# Patient Record
Sex: MALE | Race: WHITE | Employment: OTHER | URBAN - METROPOLITAN AREA
[De-identification: names, ages, dates, MRNs, and addresses within clinical notes are randomized per-mention and may not be internally consistent; named-entity substitution may affect disease eponyms.]

---

## 2019-04-06 ENCOUNTER — HOSPITAL ENCOUNTER (EMERGENCY)
Age: 34
Discharge: HOME | End: 2019-04-06
Attending: EMERGENCY MEDICINE
Payer: OTHER GOVERNMENT

## 2019-04-06 VITALS
WEIGHT: 182 LBS | OXYGEN SATURATION: 100 % | SYSTOLIC BLOOD PRESSURE: 149 MMHG | HEIGHT: 66 IN | RESPIRATION RATE: 16 BRPM | HEART RATE: 78 BPM | DIASTOLIC BLOOD PRESSURE: 86 MMHG | BODY MASS INDEX: 29.25 KG/M2

## 2019-04-06 DIAGNOSIS — M54.50 ACUTE LEFT-SIDED LOW BACK PAIN WITHOUT SCIATICA: ICD-10-CM

## 2019-04-06 DIAGNOSIS — T14.8XXA MUSCLE STRAIN: Primary | ICD-10-CM

## 2019-04-06 PROCEDURE — 74011250636 HC RX REV CODE- 250/636: Performed by: NURSE PRACTITIONER

## 2019-04-06 PROCEDURE — 99283 EMERGENCY DEPT VISIT LOW MDM: CPT

## 2019-04-06 PROCEDURE — 74011250637 HC RX REV CODE- 250/637: Performed by: NURSE PRACTITIONER

## 2019-04-06 PROCEDURE — 96372 THER/PROPH/DIAG INJ SC/IM: CPT

## 2019-04-06 RX ORDER — METHOCARBAMOL 750 MG/1
750 TABLET, FILM COATED ORAL 4 TIMES DAILY
Qty: 10 TAB | Refills: 0 | Status: SHIPPED | OUTPATIENT
Start: 2019-04-06

## 2019-04-06 RX ORDER — IBUPROFEN 600 MG/1
600 TABLET ORAL
Qty: 20 TAB | Refills: 0 | Status: SHIPPED | OUTPATIENT
Start: 2019-04-06

## 2019-04-06 RX ORDER — KETOROLAC TROMETHAMINE 30 MG/ML
30 INJECTION, SOLUTION INTRAMUSCULAR; INTRAVENOUS
Status: COMPLETED | OUTPATIENT
Start: 2019-04-06 | End: 2019-04-06

## 2019-04-06 RX ORDER — METHOCARBAMOL 500 MG/1
750 TABLET, FILM COATED ORAL
Status: COMPLETED | OUTPATIENT
Start: 2019-04-06 | End: 2019-04-06

## 2019-04-06 RX ADMIN — KETOROLAC TROMETHAMINE 30 MG: 30 INJECTION, SOLUTION INTRAMUSCULAR; INTRAVENOUS at 18:51

## 2019-04-06 RX ADMIN — METHOCARBAMOL TABLETS 750 MG: 500 TABLET, COATED ORAL at 18:51

## 2019-04-06 NOTE — DISCHARGE INSTRUCTIONS
Follow up as directed   Take medications as prescribed   You may take Tylenol every 4-6 hours do not exceed 4000 mg in a days or Motrin every 6-8 hours, only take with food and do not take on an empty stomach of if you are on blood thinner, or have a history of abdominal ulcers   Return to the ED for increased pain, pain not improving in 2-3 days, loss of bowel or bladder, numbness or tingling, numbness in your abdominal areas, difficulty walking or worsening of symptoms     Patient Education        Back Pain: Care Instructions  Your Care Instructions    Back pain has many possible causes. It is often related to problems with muscles and ligaments of the back. It may also be related to problems with the nerves, discs, or bones of the back. Moving, lifting, standing, sitting, or sleeping in an awkward way can strain the back. Sometimes you don't notice the injury until later. Arthritis is another common cause of back pain. Although it may hurt a lot, back pain usually improves on its own within several weeks. Most people recover in 12 weeks or less. Using good home treatment and being careful not to stress your back can help you feel better sooner. Follow-up care is a key part of your treatment and safety. Be sure to make and go to all appointments, and call your doctor if you are having problems. It's also a good idea to know your test results and keep a list of the medicines you take. How can you care for yourself at home? · Sit or lie in positions that are most comfortable and reduce your pain. Try one of these positions when you lie down:  ? Lie on your back with your knees bent and supported by large pillows. ? Lie on the floor with your legs on the seat of a sofa or chair. ? Lie on your side with your knees and hips bent and a pillow between your legs. ? Lie on your stomach if it does not make pain worse. · Do not sit up in bed, and avoid soft couches and twisted positions.  Bed rest can help relieve pain at first, but it delays healing. Avoid bed rest after the first day of back pain. · Change positions every 30 minutes. If you must sit for long periods of time, take breaks from sitting. Get up and walk around, or lie in a comfortable position. · Try using a heating pad on a low or medium setting for 15 to 20 minutes every 2 or 3 hours. Try a warm shower in place of one session with the heating pad. · You can also try an ice pack for 10 to 15 minutes every 2 to 3 hours. Put a thin cloth between the ice pack and your skin. · Take pain medicines exactly as directed. ? If the doctor gave you a prescription medicine for pain, take it as prescribed. ? If you are not taking a prescription pain medicine, ask your doctor if you can take an over-the-counter medicine. · Take short walks several times a day. You can start with 5 to 10 minutes, 3 or 4 times a day, and work up to longer walks. Walk on level surfaces and avoid hills and stairs until your back is better. · Return to work and other activities as soon as you can. Continued rest without activity is usually not good for your back. · To prevent future back pain, do exercises to stretch and strengthen your back and stomach. Learn how to use good posture, safe lifting techniques, and proper body mechanics. When should you call for help? Call your doctor now or seek immediate medical care if:    · You have new or worsening numbness in your legs.     · You have new or worsening weakness in your legs. (This could make it hard to stand up.)     · You lose control of your bladder or bowels.    Watch closely for changes in your health, and be sure to contact your doctor if:    · You have a fever, lose weight, or don't feel well.     · You do not get better as expected. Where can you learn more? Go to http://maren-malka.info/. Enter K275 in the search box to learn more about \"Back Pain: Care Instructions. \"  Current as of: September 20, 2018  Content Version: 11.9  © 3391-0283 [employer]. Care instructions adapted under license by [employer] (which disclaims liability or warranty for this information). If you have questions about a medical condition or this instruction, always ask your healthcare professional. [address] any warranty or liability for your use of this information.

## 2019-04-06 NOTE — ED PROVIDER NOTES
EMERGENCY DEPARTMENT HISTORY AND PHYSICAL EXAM 
 
Date: 4/6/2019 Patient Name: Jayson Cervantes History of Presenting Illness Chief Complaint Patient presents with  Back Pain History Provided By: Patient Additional History (Context):  
6:18 PM 
Jayson Cervantes is a 35 y.o. male with PMHX pretension presents to the ED c/o low back pain. The patient reports he was working out and was doing dead lifts when he stood up and felt a pop in the left side of his back and experienced severe pain. He states the pain is currently a 5 out of 10 that is located in his left back and does not radiate. The pain does increase when he attempts to change position or ambulate. He denies previous back injury. He did not take any medications prior to arrival.  Pt denies loss of bowel or bladder, numbness or tingling, previous injury, saddle paresthesia and any other sxs or complaints. PCP: Tessy, MD Kallie 
 
 
 
Past History Past Medical History: 
Past Medical History:  
Diagnosis Date  Hypertension Past Surgical History: 
History reviewed. No pertinent surgical history. Family History: 
History reviewed. No pertinent family history. Social History: 
Social History Tobacco Use  Smoking status: Current Every Day Smoker Packs/day: 0.25  Smokeless tobacco: Never Used Substance Use Topics  Alcohol use: Yes  Drug use: Never Allergies: 
Not on File Review of Systems Review of Systems Respiratory: Negative for shortness of breath. Cardiovascular: Negative for chest pain. Gastrointestinal:  
     Denies saddle paresthesia Genitourinary: Negative for dysuria. Musculoskeletal: Positive for back pain. Negative for neck pain. Skin: Negative for color change. Neurological: Negative for numbness. Hematological: Does not bruise/bleed easily. All other systems reviewed and are negative. Physical Exam  
 
Vitals:  
 04/06/19 1738 BP: 149/86 Pulse: 78 Resp: 16 SpO2: 100% Weight: 82.6 kg (182 lb) Height: 5' 6\" (1.676 m) Physical Exam  
Constitutional: He is oriented to person, place, and time. He appears well-developed and well-nourished. Ill-appearing, NAD, fit male sitting in fast track chair HENT:  
Head: Normocephalic and atraumatic. Eyes: Conjunctivae are normal.  
Neck: Normal range of motion. Neck supple. Today or tenderness to palpation Cardiovascular: Normal rate and regular rhythm. Pulmonary/Chest: Effort normal and breath sounds normal.  
Abdominal: Soft. Bowel sounds are normal. There is no tenderness. There is no rebound and no guarding. Denies saddle paresthesia Musculoskeletal: Normal range of motion. Strong equal strength bilateral lower extremities, ambulates with normal gait, sensation intact, denies paresthesia, no midline lumbar or spinal tenderness to palpation or step-off noted, left paraspinal tenderness to palpation, negative straight leg raise bilaterally Neurological: He is alert and oriented to person, place, and time. Skin: Skin is warm and dry. Nursing note and vitals reviewed. Diagnostic Study Results Labs: 
 No results found for this or any previous visit (from the past 12 hour(s)). Radiologic Studies: No orders to display CT Results  (Last 48 hours) None CXR Results  (Last 48 hours) None Medical Decision Making I am the first provider for this patient. I reviewed the vital signs, available nursing notes, past medical history, past surgical history, family history and social history. Vital Signs: Reviewed the patient's vital signs. Pulse Oximetry Analysis: 100% on RA Records Reviewed: REVIEWED OLD RECORDS AND NURSING NOTES Procedures: 
Procedures ED Course: 
6:18 PM: Initial Contact Provider Notes (Medical Decision Making): Patient presents emergency department complaining of left lower back pain after working out prior to arrival.  He denies red flag symptoms. He has no midline tenderness to palpation. No need for further imaging at this time. Will treat with anti-inflammatories and muscle relaxers with strict return precautions. 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 Pt understands reasons to return and is offering no questions or concerns. Diagnosis and Disposition Discharge Note: 
6:49 PM 
Marvie Barthel Smiley's  results have been reviewed with him. He has been counseled regarding his diagnosis, treatment, and plan. He verbally conveys understanding and agreement of the signs, symptoms, diagnosis, treatment and prognosis and additionally agrees to follow up as discussed. He also agrees with the care-plan and conveys that all of his questions have been answered. I have also provided discharge instructions for him that include: educational information regarding their diagnosis and treatment, and list of reasons why they would want to return to the ED prior to their follow-up appointment, should his condition change. He has been provided with education for proper emergency department utilization. Clinical Impression: 1. Muscle strain 2. Acute left-sided low back pain without sciatica Plan: 
1. D/C Home 2. Discharge Medication List as of 4/6/2019  7:02 PM  
  
START taking these medications Details  
ibuprofen (MOTRIN) 600 mg tablet Take 1 Tab by mouth every six (6) hours as needed for Pain., Print, Disp-20 Tab, R-0  
  
methocarbamol (ROBAXIN-750) 750 mg tablet Take 1 Tab by mouth four (4) times daily. , Print, Disp-10 Tab, R-0  
  
  
 
3. Follow-up Information Follow up With Specialties Details Why Contact Info   Schedule an appointment as soon as possible for a visit in 2 days  [phone]  THE [facility] EMERGENCY DEPT Emergency Medicine  As needed, If symptoms worsen [address] 
 [place] [id number] 
[phone] Please note that this dictation was completed with [employer], the computer voice recognition software. Quite often unanticipated grammatical, syntax, homophones, and other interpretive errors are inadvertently transcribed by the computer software. Please disregard these errors. Please excuse any errors that have escaped final proofreading.  
 
Patel Luna, LOUISEP-BC